# Patient Record
Sex: FEMALE | Race: WHITE | NOT HISPANIC OR LATINO | Employment: STUDENT | URBAN - METROPOLITAN AREA
[De-identification: names, ages, dates, MRNs, and addresses within clinical notes are randomized per-mention and may not be internally consistent; named-entity substitution may affect disease eponyms.]

---

## 2021-10-22 ENCOUNTER — HISTORICAL (OUTPATIENT)
Dept: OBSTETRICS AND GYNECOLOGY | Facility: HOSPITAL | Age: 21
End: 2021-10-22

## 2021-10-22 LAB
ABS NEUT (OLG): 6.38 X10(3)/MCL (ref 2.1–9.2)
ALBUMIN SERPL-MCNC: 3.7 GM/DL (ref 3.5–5)
ALBUMIN/GLOB SERPL: 1.1 RATIO (ref 1.1–2)
ALP SERPL-CCNC: 59 UNIT/L (ref 40–150)
ALT SERPL-CCNC: 12 UNIT/L (ref 0–55)
APPEARANCE, UA: CLEAR
AST SERPL-CCNC: 13 UNIT/L (ref 5–34)
B-HCG FREE SERPL-ACNC: 142.96 MIU/ML
BACTERIA SPEC CULT: ABNORMAL /HPF
BASOPHILS # BLD AUTO: 0.03 X10(3)/MCL (ref 0–0.2)
BASOPHILS NFR BLD AUTO: 0.3 % (ref 0–1)
BILIRUB SERPL-MCNC: 0.2 MG/DL (ref 0.2–1.2)
BILIRUB UR QL STRIP: NEGATIVE
BILIRUBIN DIRECT+TOT PNL SERPL-MCNC: 0.1 MG/DL (ref 0–0.5)
BILIRUBIN DIRECT+TOT PNL SERPL-MCNC: 0.1 MG/DL (ref 0–0.8)
BUN SERPL-MCNC: 8.9 MG/DL (ref 7–18.7)
CALCIUM SERPL-MCNC: 9.5 MG/DL (ref 8.4–10.2)
CHLORIDE SERPL-SCNC: 106 MMOL/L (ref 98–107)
CO2 SERPL-SCNC: 24 MMOL/L (ref 22–29)
COLOR UR: ABNORMAL
CREAT SERPL-MCNC: 0.74 MG/DL (ref 0.57–1.11)
EOSINOPHIL # BLD AUTO: 0.25 X10(3)/MCL (ref 0–0.9)
EOSINOPHIL NFR BLD AUTO: 2.6 % (ref 0–6.4)
ERYTHROCYTE [DISTWIDTH] IN BLOOD BY AUTOMATED COUNT: 12.1 % (ref 11.5–17)
GLOBULIN SER-MCNC: 3.5 GM/DL (ref 2.4–3.5)
GLUCOSE (UA): NEGATIVE
GLUCOSE SERPL-MCNC: 102 MG/DL (ref 74–100)
HCT VFR BLD AUTO: 32.4 % (ref 37–47)
HGB BLD-MCNC: 10.7 GM/DL (ref 12–16)
HGB UR QL STRIP: ABNORMAL
IMM GRANULOCYTES # BLD AUTO: 0.02 10*3/UL (ref 0–0.02)
IMM GRANULOCYTES NFR BLD AUTO: 0.2 % (ref 0–0.43)
KETONES UR QL STRIP: NEGATIVE
LEUKOCYTE ESTERASE UR QL STRIP: ABNORMAL
LYMPHOCYTES # BLD AUTO: 2.1 X10(3)/MCL (ref 0.6–4.6)
LYMPHOCYTES NFR BLD AUTO: 21.6 % (ref 16–44)
MCH RBC QN AUTO: 29.2 PG (ref 27–31)
MCHC RBC AUTO-ENTMCNC: 33 GM/DL (ref 33–36)
MCV RBC AUTO: 88.3 FL (ref 80–94)
MONOCYTES # BLD AUTO: 0.92 X10(3)/MCL (ref 0.1–1.3)
MONOCYTES NFR BLD AUTO: 9.5 % (ref 4–12.1)
MUCOUS THREADS URNS QL MICRO: ABNORMAL /LPF
NEUTROPHILS # BLD AUTO: 6.38 X10(3)/MCL (ref 2.1–9.2)
NEUTROPHILS NFR BLD AUTO: 65.8 % (ref 43–73)
NITRITE UR QL STRIP: NEGATIVE
NRBC BLD AUTO-RTO: 0 % (ref 0–0.2)
PH UR STRIP: 7 [PH] (ref 5–7)
PLATELET # BLD AUTO: 272 X10(3)/MCL (ref 130–400)
PMV BLD AUTO: 8.6 FL (ref 7.4–10.4)
POTASSIUM SERPL-SCNC: 3.8 MMOL/L (ref 3.5–5.1)
PROT SERPL-MCNC: 7.2 GM/DL (ref 6.4–8.3)
PROT UR QL STRIP: NEGATIVE
RBC # BLD AUTO: 3.67 X10(6)/MCL (ref 4.2–5.4)
RBC #/AREA URNS HPF: ABNORMAL /HPF
SODIUM SERPL-SCNC: 140 MMOL/L (ref 136–145)
SP GR UR STRIP: 1.02 (ref 1–1.03)
SQUAMOUS EPITHELIAL, UA: ABNORMAL /LPF
UROBILINOGEN UR STRIP-ACNC: NEGATIVE
WBC # SPEC AUTO: 9.7 X10(3)/MCL (ref 4.5–11.5)
WBC #/AREA URNS HPF: ABNORMAL /HPF

## 2021-10-24 LAB — FINAL CULTURE: NO GROWTH

## 2022-04-30 NOTE — H&P
Patient:   Shabana Perez             MRN: [id number]            FIN: [id number]               Age:   21 years     Sex:  Female     :  2000   Associated Diagnoses:   None   Author:   Deuce Lin MD      21-year-old white female  1 para 0 estimated gestational age of 7 to 8 weeks by her last menstrual period sometimes in August, that said she been bleeding for 2 or 3 weeks and then 2 days ago started having lower abdominal pain, worse on the right side.  She went to the emergency department and they did an ultrasound that showed no identifiable pregnancy and she had a quantitative hCG in the 270 range.  She was supposed to come back today for follow-up hCG level when she awoke around 3 AM having severe right lower quadrant pain that scared her since they told her it could be an ectopic pregnancy so she returned to the emergency department.  The hCG level now is around 140.  Repeat ultrasound still showed no identifiable pregnancy and only a small amount of free fluid.    The patient says she has been off birth control for at least a couple months but was not trying to get pregnant.  Her last period in August and then she has been bleeding off and on for about 2 to 3 weeks but just scant bleeding.  She not had any fever chills nausea vomiting or any sign or symptoms of infection.  Her pain is pretty much resolved now.  I examined the ultrasound images myself and it appears on the first ultrasound that there was a gestational sac at the level of the internal os that was trying to pass.  On today's ultrasound I do not see that.  She has not passed any significant tissue from her description.  Is still just dark bleeding.    She does not have any risk factors for an ectopic.  She never had any sexually transmitted diseases, chlamydia, PID, GYN surgeries, and she does not smoke.  My suspicion is that she is having a miscarriage of a very early pregnancy.  Severe pain in the right lower  quadrant is concerning however when I examined her and manipulate her uterus she feels the pain in the right lower quadrant.  Her lower cervix is somewhat thinned out and effaced consistent with a miscarriage as well.  Like she is about to pass this small amount of tissue.    Past medical history-she denies hypertension diabetes seizures asthma hepatitis coagulopathies or any other major medical problems    Past surgical history-see chart, no GYN surgeries.  She has had her tonsils removed, ear tubes, a left breast biopsy    Past OB history-negative    Past GYN history-she denies STDs, PID, ovarian cyst, fibroids, polyp.  She did have an abnormal Pap smear for what sounds like she had a colposcopy.  She sees Dr. Walker and has an appointment with him on Tuesday of next week.    Meds-none prior to now    Allergies-no known drug allergies    Social-she denies cigarettes alcohol or drugs    Family history-she denies any hereditary diseases that affect infants    Physical exam-    General-well-developed well-nourished white female in no apparent distress, pleasant and cooperative and intelligent  HEENT-benign  Neck-supple without masses  Lungs-bilateral breath sounds clear to auscultation  Cardiovascular-regular rate and rhythm without gallops or murmur  Abdomen-soft nontender nondistended, no right upper quadrant or CVA tenderness, she is tender to deep palpation in the right adnexal area.  No guarding or rebound.  No CVA tenderness  Pelvic-scant blood in the vagina, normal external genitalia, her cervix is nulliparous but somewhat effaced consistent with dilation process due to miscarriage, the cervix is still closed, the uterus is relatively small and when I manipulate the lower uterine segment area and uterus this exacerbates the pain that she has in her right lower quadrant.  There is no adnexal masses, nodularity, and no tenderness at all in the left adnexa.  The right adnexa seem to be more tender with  manipulation of the uterus then with palpation from above.  Extremities-negative  Neuro-deep tendon reflexes 2+ out of 4 plus throughout, motor symmetry intact, no focal neurological deficits elicited    Impression  1.  Nonviable pregnancy-suspect very early intrauterine pregnancy with miscarriage and process which is causing her pain.  She may also have some retrograde menstruation.  Her lab values, ultrasound images, and clinical history are go along with this other than her acute onset right lower quadrant pain this morning.  And the right lower quadrant she pain she had on admission to the ER her first visit.  Therefore there is still a concern that this could be a ectopic pregnancy.  On the right side.  Even if so, it could be spontaneously resolving as well.    Plan  1.  Will treat for Ectopic pregnancy to err on the conservative side, with methotrexate.  The thought process and treatment options were discussed with the patient and her mother.  They agree to proceed with the treatment as planned and understand the risk and benefits.  She understand if she starts having acute pain in the future or any evidence of rupture ectopic to return to the emergency department right away.  She otherwise is going to follow-up with her primary OB, Dr. Walker, on Tuesday of next week.  He can follow her quantitative hCGs to 0.  She also was warned that if this possibly was an ectopic pregnancy that she could be at increased risk for one with her next pregnancy and that she needed to seek care very early in her next pregnancy.    Jaskaran Lin MD      Chief Complaint   10/22/2021 4:01 CDT      pt was told she had miscarriage. pt still having side pain. seen here previously on 10/20/21        Health Status   Allergies:    Allergic Reactions (Selected)  No Known Allergies,    Allergies (1) Active Reaction  No Known Allergies None Documented     Current medications:  (Selected)   Prescriptions  Prescribed  Norco 5 mg-325 mg  oral tablet: 1 tab(s), Oral, q6hr, PRN PRN as needed for pain, X 3 day(s), # 12 tab(s), 0 Refill(s)  Documented Medications  Documented  ibuprofen 800 mg oral tablet: 800 mg = 1 tab(s), Oral, q6hr  miSOPROStol 200 mcg oral tablet: 200 mcg = 1 tab(s), Oral, QID  nitrofurantoin macrocrystals-monohydrate 100 mg oral capsule: 100 mg = 1 cap(s), Oral, BID  promethazine 25 mg oral tablet: 25 mg = 1 tab(s), Oral, q6hr  traMADol 50 mg oral tablet: 50 mg, 1-2 tab(s), Oral, q6hr,    Home Medications (6) Active  ibuprofen 800 mg oral tablet 800 mg = 1 tab(s), Oral, q6hr  miSOPROStol 200 mcg oral tablet 200 mcg = 1 tab(s), Oral, QID  nitrofurantoin macrocrystals-monohydrate 100 mg oral capsule 100 mg = 1 cap(s), Oral, BID  Norco 5 mg-325 mg oral tablet 1 tab(s), PRN, Oral, q6hr  promethazine 25 mg oral tablet 25 mg = 1 tab(s), Oral, q6hr  traMADol 50 mg oral tablet 50 mg, Oral, q6hr        Histories   Family History:    No family history items have been selected or recorded.   Procedure history:    No active procedure history items have been selected or recorded.   Social History        Social & Psychosocial Habits    Tobacco  10/20/2021  Use: Never (less than 100 in l    Patient Wants Consult For Cessation Counseling No    10/22/2021  Use: Never (less than 100 in l    Patient Wants Consult For Cessation Counseling N/A    Abuse/Neglect  10/20/2021  [facility] Any signs of abuse or neglect No    Feels unsafe at home: No    Safe place to go: Yes    10/22/2021  [facility] Any signs of abuse or neglect No  .        Physical Examination   Vital Signs   10/22/2021 8:13 CDT      Temperature Oral          37.0 DegC                             Temperature Oral (calculated)             98.60 DegF                             Apical Heart Rate         78 bpm                             Respiratory Rate          18 br/min                             Oxygen Therapy            Room air                             Systolic Blood Pressure   118 mmHg                              Diastolic Blood Pressure  75 mmHg                             Mean Arterial Pressure, Cuff              89 mmHg    10/22/2021 8:00 CDT      Oxygen Therapy            Room air    10/22/2021 7:18 CDT      Peripheral Pulse Rate     74 bpm                             Respiratory Rate          17 br/min                             SpO2                      99 %                             Systolic Blood Pressure   111 mmHg                             Diastolic Blood Pressure  68 mmHg    10/22/2021 6:38 CDT      Peripheral Pulse Rate     71 bpm                             Respiratory Rate          16 br/min                             SpO2                      99 %                             Oxygen Therapy            Room air                             Systolic Blood Pressure   118 mmHg                             Diastolic Blood Pressure  63 mmHg    10/22/2021 4:10 CDT      Oxygen Therapy            Room air    10/22/2021 4:01 CDT      Temperature Oral          36.7 DegC                             Temperature Oral (calculated)             98.06 DegF                             Peripheral Pulse Rate     98 bpm                             Respiratory Rate          18 br/min                             SpO2                      98 %                             Oxygen Therapy            Room air                             Systolic Blood Pressure   124 mmHg                             Diastolic Blood Pressure  85 mmHg     Measurements from flowsheet : Measurements   10/22/2021 8:13 CDT      Weight Dosing             63.5 kg                             Weight Measured           61.23 kg                             Weight Measured and Calculated in Lbs     139.99 lb                             Weight Estimated          61.23 kg                             Height/Length Dosing      170 cm                             Height/Length Measured    170.18 cm                             Height/Length Estimated    170.18 cm                             BSA Measured              1.7 m2                             BSA Estimated             1.7 m2                             Body Mass Index Estimated 21.14 kg/m2                             Body Mass Index Measured  21.14 kg/m2                             Ideal Body Weight Calculated              62 kg    10/22/2021 4:01 CDT      Weight Dosing             63.5 kg                             Weight Measured and Calculated in Lbs     139.99 lb                             Weight Estimated          63.5 kg                             Height/Length Dosing      170 cm                             Height/Length Estimated   170 cm                             Body Mass Index Estimated 21.97 kg/m2        Health Maintenance      Health Maintenance     Pending (in the next year)        OverDue           Alcohol Misuse Screening due  01/02/21  and every 1  year(s)        Due            ADL Screening due  10/22/21  and every 1  year(s)           Tetanus Vaccine due  10/22/21  and every 10  year(s)        Due In Future            Obesity Screening not due until  01/01/22  and every 1  year(s)     Satisfied (in the past 1 year)        Satisfied            Blood Pressure Screening on  10/22/21.  Satisfied by Marcus Rowe RN.           Body Mass Index Check on  10/22/21.  Satisfied by Marcus Rowe RN           Obesity Screening on  10/22/21.  Satisfied by Marcus Rowe RN          Review / Management   Results review:  Lab results   10/22/2021 4:43 CDT      Est Creat Clearance Ser   115.66 mL/min    10/22/2021 4:15 CDT      Sodium Lvl                140 mmol/L                             Potassium Lvl             3.8 mmol/L                             Chloride                  106 mmol/L                             CO2                       24 mmol/L                             Calcium Lvl               9.5 mg/dL                             Glucose Lvl               102 mg/dL  HI                              BUN                       8.9 mg/dL                             Creatinine                0.74 mg/dL                             eGFR-AA                   >60  NA                             eGFR-GRACIA                  >60 mL/min/1.73 m2  NA                             Bili Total                0.2 mg/dL                             Bili Direct               0.1 mg/dL                             Bili Indirect             0.10 mg/dL                             AST                       13 unit/L                             ALT                       12 unit/L                             Alk Phos                  59 unit/L                             Total Protein             7.2 gm/dL                             Albumin Lvl               3.7 gm/dL                             Globulin                  3.5 gm/dL                             A/G Ratio                 1.1 ratio                             Beta hCG Qnt              142.96 mIU/mL  HI                             WBC                       9.7 x10(3)/mcL                             RBC                       3.67 x10(6)/mcL  LOW                             Hgb                       10.7 gm/dL  LOW                             Hct                       32.4 %  LOW                             Platelet                  272 x10(3)/mcL                             MCV                       88.3 fL                             MCH                       29.2 pg                             MCHC                      33.0 gm/dL                             RDW                       12.1 %                             MPV                       8.6 fL                             Abs Neut                  6.38 x10(3)/mcL                             Neutro Auto               65.8 %                             Lymph Auto                21.6 %                             Mono Auto                 9.5 %                             Eos Auto                  2.6 %                              Abs Eos                   0.25 x10(3)/mcL                             Basophil Auto             0.3 %                             Abs Neutro                6.38 x10(3)/mcL                             Abs Lymph                 2.10 x10(3)/mcL                             Abs Mono                  0.92 x10(3)/mcL                             Abs Baso                  0.03 x10(3)/mcL                             NRBC%                     0.0 %                             IG%                       0.200 %                             IG#                       0.0200  HI                             UA Appear                 CLEAR                             UA Color                  STRAW                             UA Spec Grav              1.025                             UA Bili                   Negative                             UA pH                     7.0                             UA Urobilinogen           Negative                             UA Blood                  Trace                             UA Glucose                Negative                             UA Ketones                Negative                             UA Protein                Negative                             UA Nitrite                Negative                             UA Leuk Est               Trace                             UA WBC                    10-20 /HPF                             UA RBC                    2-5 /HPF                             UA Mucous                 Few /LPF                             UA Bacteria               Few /HPF                             UA Squam Epithelial       Few /LPF  .

## 2022-04-30 NOTE — ED PROVIDER NOTES
Patient:   Shabana Perez             MRN: [id number]            FIN: [id number]               Age:   21 years     Sex:  Female     :  2000   Associated Diagnoses:   Miscarriage; Right adnexal tenderness; Pregnancy, location unknown   Author:   Coco MONSALVE, Emmanuel ACEVEDO      Basic Information   Time seen: Immediately upon arrival.   History source: Patient, mother.   Arrival mode: Private vehicle.   History limitation: None.   Additional information: Chief Complaint from Nursing Triage Note : Chief Complaint   10/22/2021 4:01 CDT      Chief Complaint           pt was told she had miscarriage. pt still having side pain. seen here previously on 10/20/21  .      History of Present Illness   The patient presents for re-evaluation of abdominal pain and pelvic pain.  Previous visit(s) to the emergency department 2 days ago.  Symptoms since visit: abdominal pain rated as moderate.  The course/duration of symptoms is worsening and fluctuating in intensity.  Location: Right lower abdomen. Prior studies: US lab draw.  Therapy today: prescription medications including norco.  Risk factors consist of pregnancy.  Associated symptoms: abdominal pain, nausea, denies chest pain, denies vomiting, denies shortness of breath and denies fever.  Additional history: 21-year-old female presents emergency department for worsening right lower abdominal pain.  Patient was seen emergency department 2 days ago, on 10/20/2021, for similar complaints.  Laboratory results at that time showed a beta hCG of 267.85.  Ultrasound was obtained showing pregnancy of unknown location.  OB/GYN on-call was consulted and recommended transfer to [facility] ED for repeat eval or follow-up within 48 hours with repeat hCG.  Patient states that she was trying to hold on until the morning although the pain worsened so she came to emergency department.  States she has been having persistent vaginal bleeding since..        Review of Systems    Constitutional symptoms:  No fever,    Skin symptoms:  No jaundice, no rash.    Eye symptoms:  Vision unchanged.   ENMT symptoms:  No sore throat, no nasal congestion.    Respiratory symptoms:  No shortness of breath, no cough.    Cardiovascular symptoms:  No chest pain,    Gastrointestinal symptoms:  Abdominal pain, moderate, right lower quadrant.    Genitourinary symptoms:  Vaginal bleeding, No dysuria,              Additional review of systems information: All other systems reviewed and otherwise negative.      Health Status   Allergies:    Allergic Reactions (Selected)  No Known Allergies,    Allergies (1) Active Reaction  No Known Allergies None Documented  .   Medications:  (Selected)   Inpatient Medications  Ordered  IVF Lactated Ringers LR Bolus 1000ml 1,000 mL: 1,000 mL, 1,000 mL, IV, Bolus, start date 10/22/21 4:10:00 CDT, 1.74, m2  Toradol: 30 mg, form: Injection, IV Push, Once, first dose 10/22/21 4:10:00 CDT, stop date 10/22/21 4:10:00 CDT, STAT  Prescriptions  Prescribed  Norco 5 mg-325 mg oral tablet: 1 tab(s), Oral, q6hr, PRN PRN as needed for pain, X 3 day(s), # 12 tab(s), 0 Refill(s).      Past Medical/ Family/ Social History   Medical history:    No active or resolved past medical history items have been selected or recorded..   Surgical history:    No active procedure history items have been selected or recorded..   Family history:    No family history items have been selected or recorded..   Social history:    Social & Psychosocial Habits    Tobacco  10/20/2021  Use: Never (less than 100 in l    Patient Wants Consult For Cessation Counseling No    10/22/2021  Use: Never (less than 100 in l    Patient Wants Consult For Cessation Counseling N/A    Abuse/Neglect  10/20/2021  SHX Any signs of abuse or neglect No    Feels unsafe at home: No    Safe place to go: Yes    10/22/2021  SHX Any signs of abuse or neglect No  .      Physical Examination               Vital Signs      Vital Signs (last 24  hrs)_____  Last Charted___________  Temp Oral     36.7 DegC  (OCT 22 04:01)  Heart Rate Peripheral   98 bpm  (OCT 22 04:01)  Resp Rate         18 br/min  (OCT 22 04:01)  SBP      124 mmHg  (OCT 22 04:01)  DBP      85 mmHg  (OCT 22 04:01)  SpO2      98 %  (OCT 22 04:01)  .   General:  Alert, mild distress, Not ill-appearing,    Skin:  Warm, dry.    Head:  Atraumatic.   Neck:  Supple.   Eye:  Normal conjunctiva.   Ears, nose, mouth and throat:  Oral mucosa moist.   Cardiovascular:  Regular rate and rhythm, Normal peripheral perfusion, No edema.    Respiratory:  Lungs are clear to auscultation, respirations are non-labored, breath sounds are equal, Symmetrical chest wall expansion.    Gastrointestinal:  Soft, Non distended, Tenderness: Moderate, right lower quadrant, Guarding: Moderate, voluntary, Rebound: Negative.    Musculoskeletal:  Normal ROM, normal strength.    Neurological:  Alert and oriented to person, place, time, and situation, No focal neurological deficit observed.    Psychiatric:  Cooperative, appropriate mood & affect.       Medical Decision Making   Differential Diagnosis:  Miscarriage, incomplete miscarriage, ectopic pregnancy, molar pregnancy, ovarian cyst, ruptured ovarian cyst.   Documents reviewed:  Emergency department nurses' notes, emergency department records, prior records.    Labs Last 24 Hours   Chemistry  Hematology/Coagulation    Sodium Lvl: 140 mmol/L (10/22/21 04:15:00) WBC: 9.7 x10(3)/mcL (10/22/21 04:15:00)   Potassium Lvl: 3.8 mmol/L (10/22/21 04:15:00) RBC: 3.67 x10(6)/mcL Low (10/22/21 04:15:00)   Chloride: 106 mmol/L (10/22/21 04:15:00) Hgb: 10.7 gm/dL Low (10/22/21 04:15:00)   CO2: 24 mmol/L (10/22/21 04:15:00) Hct: 32.4 % Low (10/22/21 04:15:00)   Calcium Lvl: 9.5 mg/dL (10/22/21 04:15:00) Platelet: 272 x10(3)/mcL (10/22/21 04:15:00)   Glucose Lvl: 102 mg/dL High (10/22/21 04:15:00) MCV: 88.3 fL (10/22/21 04:15:00)   BUN: 8.9 mg/dL (10/22/21 04:15:00) MCH: 29.2 pg (10/22/21  04:15:00)   Creatinine: 0.74 mg/dL (10/22/21 04:15:00) MCHC: 33 gm/dL (10/22/21 04:15:00)   Est Creat Clearance Ser: 115.66 mL/min (10/22/21 04:43:35) RDW: 12.1 % (10/22/21 04:15:00)   eGFR-AA: >60 (10/22/21 04:15:00) MPV: 8.6 fL (10/22/21 04:15:00)   eGFR-GRACIA: >60 (10/22/21 04:15:00) Abs Neut: 6.38 x10(3)/mcL (10/22/21 04:15:00)   Bili Total: 0.2 mg/dL (10/22/21 04:15:00) Neutro Auto: 65.8 % (10/22/21 04:15:00)   Bili Direct: 0.1 mg/dL (10/22/21 04:15:00) Lymph Auto: 21.6 % (10/22/21 04:15:00)   Bili Indirect: 0.1 mg/dL (10/22/21 04:15:00) Mono Auto: 9.5 % (10/22/21 04:15:00)   AST: 13 unit/L (10/22/21 04:15:00) Eos Auto: 2.6 % (10/22/21 04:15:00)   ALT: 12 unit/L (10/22/21 04:15:00) Abs Eos: 0.25 x10(3)/mcL (10/22/21 04:15:00)   Alk Phos: 59 unit/L (10/22/21 04:15:00) Basophil Auto: 0.3 % (10/22/21 04:15:00)   Total Protein: 7.2 gm/dL (10/22/21 04:15:00) Abs Neutro: 6.38 x10(3)/mcL (10/22/21 04:15:00)   Albumin Lvl: 3.7 gm/dL (10/22/21 04:15:00) Abs Lymph: 2.1 x10(3)/mcL (10/22/21 04:15:00)   Globulin: 3.5 gm/dL (10/22/21 04:15:00) Abs Mono: 0.92 x10(3)/mcL (10/22/21 04:15:00)   A/G Ratio: 1.1 ratio (10/22/21 04:15:00) Abs Baso: 0.03 x10(3)/mcL (10/22/21 04:15:00)   Beta hCG Qnt: 142.96 mIU/mL High (10/22/21 04:15:00) NRBC%: 0.0 (10/22/21 04:15:00)    IG%: 0.2 % (10/22/21 04:15:00)    IG#: 0.02 High (10/22/21 04:15:00)   Accession: [id number]  Order: US OB 1st Trimester w Transvag if needed  Report Dt/Tm: 10/22/2021 06:15  Report:   Early OB ultrasound     Clinical history is abdominal pain     Exam is performed both transabdominally and endovaginally           Uterus measures 8.1 x 4.4 x 5 cm.     There is a small amount of free fluid in the cul-de-sac.     The right ovary measures 2.9 x 0.9 x 1.6 cm.     The left ovary measures 1.6 x 2.7 x 1.7 cm. There is flow present.     An intrauterine gestation is not demonstrated.     IMPRESSION: An intrauterine gestation is not seen. Differential would  include very  early intrauterine pregnancy versus complete AB versus  ectopic pregnancy.         Reexamination/ Reevaluation   Time: 10/22/2021 06:31:00 .   Vital signs   per nurse's notes   Course: progressing as expected.   Pain status: decreased.   Notes: Patient resting on the bed no acute distress.  Vital signs stable.  Patient states her pain is improved after Toradol although she still does have right-sided pelvic pain.  Patient with less guarding on palpation although  in the right adnexa.  Patient agrees to get transferred to [facility] for evaluation for possible methotrexate treatment for possible ectopic pregnancy/miscarriage..      Impression and Plan   Diagnosis   Miscarriage ([id number] O03.9)   Right adnexal tenderness ([id number] R10.2)   Pregnancy, location unknown ([id number] O36.80X0)      Calls-Consults   -  10/22/2021 05:00:00 , Carrie Galindo MD, consult, recommends Repeat ultrasound to rule out hemoperitoneum.  Call with results..    -  10/22/2021 06:30:00 , Carrie Galindo MD, OB/GYN, recommends Transfer from mother-baby for possible methotrexate treatment secondary to patient's adnexal pain and ultrasound with inconclusive results..    Plan   Disposition: Admit time  10/22/2021 06:33:00, Place in Observation Unit, Carrie Galindo MD.    Counseled: Patient, Family, Regarding diagnosis, Regarding diagnostic results, Regarding treatment plan, Patient indicated understanding of instructions.